# Patient Record
(demographics unavailable — no encounter records)

---

## 2025-04-15 NOTE — HISTORY OF PRESENT ILLNESS
[de-identified] : 60M here for a f/u w/ CC of question regarding his heart  Had CT Cardiac for coronaries. Calcium score of 196 total, lad 138, minimal to mild stenosis. No cp, pre-syncope, palpitations. Cards said should go on statin but patient likes to be conservative w/ meds. Would like to try holistic lifestyle changes before starting statin. Requesting checking lipid panel again today. Had weight gain but this is because he is back to normal diet. Lost weight before due to extensive cardiovascular training for cross-country biking. Reviewed prev. cardiac MRI w/ patient.   Also has fingernail and toenail fungal infection. Tried to self-tx as he wanted to avoid terbinafine due to concern of affecting liver. But is open to it now.

## 2025-04-15 NOTE — PHYSICAL EXAM
[Normal Sclera/Conjunctiva] : normal sclera/conjunctiva [Normal Gait] : normal gait [Alert and Oriented x3] : oriented to person, place, and time [Normal] : affect was normal and insight and judgment were intact [de-identified] : Right thumb nailbed yellow discoloration with degradation.

## 2025-04-15 NOTE — END OF VISIT
[FreeTextEntry3] : I personally spent a total of 45 minutes providing evaluation and management services for this patient on the date of service. This included both face-to-face time spent counseling and coordinating care with the patient, as well as non-face-to-face time reviewing records, analyzing diagnostic results, and completing medical documentation. Direct patient counseling constituted more than 50% of the total encounter time and included discussing the assessment, treatment options, and addressing the patient's questions regarding their condition and care plan.

## 2025-04-15 NOTE — HISTORY OF PRESENT ILLNESS
[de-identified] : 60M here for a f/u w/ CC of question regarding his heart  Had CT Cardiac for coronaries. Calcium score of 196 total, lad 138, minimal to mild stenosis. No cp, pre-syncope, palpitations. Cards said should go on statin but patient likes to be conservative w/ meds. Would like to try holistic lifestyle changes before starting statin. Requesting checking lipid panel again today. Had weight gain but this is because he is back to normal diet. Lost weight before due to extensive cardiovascular training for cross-country biking. Reviewed prev. cardiac MRI w/ patient.   Also has fingernail and toenail fungal infection. Tried to self-tx as he wanted to avoid terbinafine due to concern of affecting liver. But is open to it now.

## 2025-04-15 NOTE — PHYSICAL EXAM
[Normal Sclera/Conjunctiva] : normal sclera/conjunctiva [Normal Gait] : normal gait [Alert and Oriented x3] : oriented to person, place, and time [Normal] : affect was normal and insight and judgment were intact [de-identified] : Right thumb nailbed yellow discoloration with degradation.

## 2025-04-15 NOTE — PLAN
[FreeTextEntry1] : #CAD Discussed extensively of risk/benefits of starting statin. Patient electing to defer statin for now. Recheck lipid panel today. Can repeat CT coronary annually especially if deferring statin.   #Onychomycosis Patient amenable to starting oral terbinafine. Discussed hepatotoxicity effects. Will check CMP to establish baseline of LFTs. Ordered CMP for repeat check 6 weeks from start as well. Total course should be 12 weeks given toenails are also affected.

## 2025-04-15 NOTE — PLAN
[FreeTextEntry1] : #CAD Discussed extensively of risk/benefits of starting statin. Patient electing to defer statin for now. Recheck lipid panel today. Can repeat CT coronary annually especially if deferring statin.   #Onychomycosis Patient amenable to starting oral terbinafine. Discussed hepatotoxicity effects. Will check CMP to establish baseline of LFTs. Ordered CMP for repeat check 6 weeks from start as well. Total course should be 12 weeks given toenails are also affected.  none